# Patient Record
Sex: MALE | Race: WHITE | Employment: UNEMPLOYED | URBAN - METROPOLITAN AREA
[De-identification: names, ages, dates, MRNs, and addresses within clinical notes are randomized per-mention and may not be internally consistent; named-entity substitution may affect disease eponyms.]

---
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
## 2025-04-10 ENCOUNTER — OFFICE VISIT (OUTPATIENT)
Dept: URGENT CARE | Facility: URGENT CARE | Age: 20
End: 2025-04-10
Payer: COMMERCIAL

## 2025-04-10 VITALS
DIASTOLIC BLOOD PRESSURE: 75 MMHG | HEART RATE: 74 BPM | TEMPERATURE: 98.3 F | OXYGEN SATURATION: 96 % | SYSTOLIC BLOOD PRESSURE: 121 MMHG | WEIGHT: 231 LBS | HEIGHT: 70 IN | BODY MASS INDEX: 33.07 KG/M2 | RESPIRATION RATE: 18 BRPM

## 2025-04-10 DIAGNOSIS — S05.02XA ABRASION OF LEFT CORNEA, INITIAL ENCOUNTER: Primary | ICD-10-CM

## 2025-04-10 NOTE — PROGRESS NOTES
"Assessment & Plan     Abrasion of left cornea, initial encounter  Numbed with eye drops proparacaine and stained with fluorescein and under black light examination there is a horizontal linear abrasion of the medial left cornea. No foreign bodies. No signs of infection. Advised symptomatic measures of warm pack as needed, Tylenol or ibuprofen as needed and giving it time to heal, typically heals over the course of 3-4 days. Follow up if symptoms persist or worsen.           No follow-ups on file.    [facility] [place]    Jazmyn Julien is a 19 year old male who presents to clinic today for the following health issues:  Chief Complaint   Patient presents with    Urgent Care    Eye Problem     Per patient states he was smacked in his left eye with a tree branch yesterday no vision problems, but having pain and discharge from the eye.         4/10/2025     5:22 PM   Additional Questions   Roomed by BARBARA Horton   Accompanied by Self     HPI      Review of Systems  Constitutional, HEENT, cardiovascular, pulmonary, gi and gu systems are negative, except as otherwise noted.      Objective    /75   Pulse 74   Temp 98.3  F (36.8  C) (Tympanic)   Resp 18   Ht 1.78 m (5' 10.08\")   Wt 104.8 kg (231 lb)   SpO2 96%   BMI 33.07 kg/m    Physical Exam   GENERAL: alert and no distress  EYES: PERRL, EOMI, and conjunctiva/corneas- horizontal linear abrasion of the medial left cornea under black light. No foreign bodies  MS: no gross musculoskeletal defects noted, no edema  SKIN: no suspicious lesions or rashes  NEURO: Normal strength and tone, mentation intact and speech normal  PSYCH: mentation appears normal, affect normal/bright        "

## 2025-04-10 NOTE — PROGRESS NOTES
Urgent Care Clinic Visit    Chief Complaint   Patient presents with    Urgent Care    Eye Problem     Per patient states he was smacked in his left eye with a tree branch yesterday no vision problems, but having pain and discharge from the eye.              4/10/2025     5:22 PM   Additional Questions   Roomed by BARBARA Horton   Accompanied by Self

## 2025-05-21 ENCOUNTER — OFFICE VISIT (OUTPATIENT)
Dept: URGENT CARE | Facility: URGENT CARE | Age: 20
End: 2025-05-21
Payer: COMMERCIAL

## 2025-05-21 VITALS
SYSTOLIC BLOOD PRESSURE: 108 MMHG | OXYGEN SATURATION: 98 % | WEIGHT: 223.2 LBS | BODY MASS INDEX: 31.95 KG/M2 | TEMPERATURE: 98.2 F | DIASTOLIC BLOOD PRESSURE: 69 MMHG | HEART RATE: 86 BPM | RESPIRATION RATE: 17 BRPM

## 2025-05-21 DIAGNOSIS — S05.01XA ABRASION OF RIGHT CONJUNCTIVA, INITIAL ENCOUNTER: Primary | ICD-10-CM

## 2025-05-21 PROCEDURE — 3078F DIAST BP <80 MM HG: CPT | Performed by: PHYSICIAN ASSISTANT

## 2025-05-21 PROCEDURE — 99213 OFFICE O/P EST LOW 20 MIN: CPT | Performed by: PHYSICIAN ASSISTANT

## 2025-05-21 PROCEDURE — 3074F SYST BP LT 130 MM HG: CPT | Performed by: PHYSICIAN ASSISTANT

## 2025-05-21 RX ORDER — POLYMYXIN B SULFATE AND TRIMETHOPRIM 1; 10000 MG/ML; [USP'U]/ML
1 SOLUTION OPHTHALMIC
Qty: 10 ML | Refills: 0 | Status: SHIPPED | OUTPATIENT
Start: 2025-05-21 | End: 2025-05-28

## 2025-05-22 NOTE — PATIENT INSTRUCTIONS
Conjunctival abrasion  Use Polytrim eye drops to prevent infection  Follow up to see eye doctor if symptoms worsen or do not improve

## 2025-05-22 NOTE — PROGRESS NOTES
Assessment & Plan     Abrasion of right conjunctiva, initial encounter  - polymixin b-trimethoprim (POLYTRIM) 02698-9.1 UNIT/ML-% ophthalmic solution; Place 1 drop into both eyes every 4 hours (while awake) for 7 days.    Conjunctival abrasion- Polytrim to prevent secondary infection. Be seen if symptoms significantly worsen or do not improve after 1 week.    Return in about 3 days (around 5/24/2025) for see eye doctor if symptoms worsen or persist beyond 1 week.     Marcia Kim PA-C  [facility]    Subjective   Wily Wilson is a 19 year old who presents for the following health issues     Patient presents with:  Eye Problem: Something stuck in right eye for about an hour. Redness, swelling, blurred vision, pain.       HPI    Wily presents for evaluation of right eye pain  Symptoms began about 1 hr ago  He was driving and suddenly had pain in his eye  No light sensitivity, minimally blurred vision, eye is watering    Review of Systems   ROS negative except as stated above.      Objective    /69 (BP Location: Left arm, Cuff Size: Adult Large)   Pulse 86   Temp 98.2  F (36.8  C) (Tympanic)   Resp 17   Wt 101.2 kg (223 lb 3.2 oz)   SpO2 98%   BMI 31.95 kg/m    Physical Exam   GENERAL: alert and no distress  EYES: Right conjunctiva mildly injected, tearing. Eyes otherwise grossly normal to inspection, PERRL and conjunctivae and sclerae normal    Exam of right eye with fluorescein under black light show a small linear abrasion in the conjunctiva about 2 mm long. No foreign bodies seen.     No results found for any visits on 05/21/25.

## 2025-05-22 NOTE — PROGRESS NOTES
Urgent Care Clinic Visit  Chief Complaint   Patient presents with    Eye Problem     Something stuck in right eye for about an hour. Redness, swelling, blurred vision, pain.            5/21/2025     7:25 PM   Additional Questions   Roomed by Mirta   Accompanied by Girlfriend